# Patient Record
Sex: FEMALE | Race: BLACK OR AFRICAN AMERICAN | NOT HISPANIC OR LATINO | URBAN - NONMETROPOLITAN AREA
[De-identification: names, ages, dates, MRNs, and addresses within clinical notes are randomized per-mention and may not be internally consistent; named-entity substitution may affect disease eponyms.]

---

## 2021-01-08 ENCOUNTER — IMPORTED ENCOUNTER (OUTPATIENT)
Dept: URBAN - NONMETROPOLITAN AREA CLINIC 1 | Facility: CLINIC | Age: 61
End: 2021-01-08

## 2021-01-08 PROBLEM — H25.813: Noted: 2021-01-08

## 2021-01-08 PROBLEM — H52.4: Noted: 2021-01-08

## 2021-01-08 PROBLEM — E11.9: Noted: 2021-01-08

## 2021-01-08 PROCEDURE — 92004 COMPRE OPH EXAM NEW PT 1/>: CPT

## 2021-01-08 PROCEDURE — 92015 DETERMINE REFRACTIVE STATE: CPT

## 2021-01-08 NOTE — PATIENT DISCUSSION
Presbyopia OUDiscussed refractive status in detail with patient. MR done today but do not recommend updating due to cataract progression. Continue to monitor. IDDM sans Retinopathy Discussed diagnosis with patient. Discussed the risk of diabetic damage of the retina with potential vision loss and the importance of routine follow-up. Emphasized strict blood sugar control. Continue to monitor. Combined Cataract OU Discussed diagnosis with patient. Reviewed symptoms related to cataract progression. Discussed various treatment options with patient. Recommend cataract evaluation by Dr. Elayne Bright. Patient elects to schedule evaluation.

## 2021-02-17 ENCOUNTER — IMPORTED ENCOUNTER (OUTPATIENT)
Dept: URBAN - NONMETROPOLITAN AREA CLINIC 1 | Facility: CLINIC | Age: 61
End: 2021-02-17

## 2021-02-17 PROBLEM — H25.813: Noted: 2021-02-17

## 2021-02-17 PROCEDURE — 99214 OFFICE O/P EST MOD 30 MIN: CPT

## 2021-02-17 NOTE — PATIENT DISCUSSION
Cataract(s)-Visually significant cataract OU .-Cataract(s) causing symptomatic impairment of visual function not correctable with a tolerable change in glasses or contact lenses lighting or non-operative means resulting in specific activity limitations and/or participation restrictions including but not limited to reading viewing television driving or meeting vocational or recreational needs. -Expectation is clearer vision and functional improvement in symptoms as well as reduced glare disability after cataract removal.-Order IOLMaster and OPD today. -Recommend Stand/Trad based on today's OPD testing and lifestyle questionnaire.-All questions were answered regarding surgery including pre and post-op medications appointments activity restrictions and anesthetic usage.-The risks benefits and alternatives and special risk factors for the patient were discussed in detail including but not limited to: bleeding infection retinal detachment vitreous loss problems with the implant and possible need for additional surgery.-Although rare the possibility of complete vision loss was discussed.-The possible need for glasses post-operatively was discussed.-Order medical clearance exam based on history of diabetes-Patient elects to proceed with cataract surgery OD . Will schedule at patient's convenience and re-evaluate OS  in the future.

## 2021-03-23 ENCOUNTER — IMPORTED ENCOUNTER (OUTPATIENT)
Dept: URBAN - NONMETROPOLITAN AREA CLINIC 1 | Facility: CLINIC | Age: 61
End: 2021-03-23

## 2021-03-23 PROBLEM — Z01.818: Noted: 2021-03-23

## 2021-03-23 PROBLEM — M06.9: Noted: 2021-03-23

## 2021-03-23 PROBLEM — E78.5: Noted: 2021-03-23

## 2021-03-23 PROBLEM — E11.9: Noted: 2021-03-23

## 2021-03-23 PROBLEM — I10: Noted: 2021-03-23

## 2021-03-23 PROCEDURE — 99214 OFFICE O/P EST MOD 30 MIN: CPT

## 2021-04-13 ENCOUNTER — IMPORTED ENCOUNTER (OUTPATIENT)
Dept: URBAN - NONMETROPOLITAN AREA CLINIC 1 | Facility: CLINIC | Age: 61
End: 2021-04-13

## 2021-04-13 PROBLEM — Z98.41: Noted: 2021-04-13

## 2021-04-13 PROBLEM — H25.812: Noted: 2021-04-13

## 2021-04-13 PROCEDURE — 99024 POSTOP FOLLOW-UP VISIT: CPT

## 2021-04-13 PROCEDURE — 66982 XCAPSL CTRC RMVL CPLX WO ECP: CPT

## 2021-04-13 PROCEDURE — 92136 OPHTHALMIC BIOMETRY: CPT

## 2021-04-13 NOTE — PATIENT DISCUSSION
Cataract(s)-Visually significant cataract OS . -Cataract(s) causing symptomatic impairment of visual function not correctable with a tolerable change in glasses or contact lenses lighting or non-operative means resulting in specific activity limitations and/or participation restrictions including but not limited to reading viewing television driving or meeting vocational or recreational needs. -Expectation is clearer vision and functional improvement in symptoms as well as reduced glare disability after cataract removal.-Recommend Stand/Trad based on previous OPD testing and lifestyle questionnaire.-All questions were answered regarding surgery including pre and post-op medications appointments activity restrictions and anesthetic usage.-The risks benefits and alternatives and special risk factors for the patient were discussed in detail including but not limited to: bleeding infection retinal detachment vitreous loss problems with the implant and possible need for additional surgery.-Although rare the possibility of complete vision loss was discussed.-The need for glasses post-operatively was discussed.-Patient elects to proceed with cataract surgery OS . Will schedule at patient's convenience. s/p PCIOL Same Day POV CE OD Stand/Trad -Pt doing well s/p PCIOL. -Continue post-op gtts according to instruction sheet and sleep with eye shield over eye for 7 nights.-Avoid bending at the waist lifting anything over 5lbs and dirty or ingrid environments.

## 2021-04-21 ENCOUNTER — IMPORTED ENCOUNTER (OUTPATIENT)
Dept: URBAN - NONMETROPOLITAN AREA CLINIC 1 | Facility: CLINIC | Age: 61
End: 2021-04-21

## 2021-04-21 PROBLEM — H26.491: Noted: 2021-04-21

## 2021-04-21 PROBLEM — Z98.41: Noted: 2021-04-13

## 2021-04-21 PROBLEM — Z98.42: Noted: 2021-04-21

## 2021-04-21 PROCEDURE — 99024 POSTOP FOLLOW-UP VISIT: CPT

## 2021-04-21 NOTE — PATIENT DISCUSSION
1 Day POV CE OS 04/20/21 CE OD 04/13/2021 Stand OU- Discussed diagnosis in detail with patient- Patient is stable and doing well- Wound intact- Continue all post op drops as directed- PCO OD noted but stable and no treatment needed at this time - Continue to monitor- RTC 1 week POV with Dr. Matthew Fields

## 2021-04-26 ENCOUNTER — IMPORTED ENCOUNTER (OUTPATIENT)
Dept: URBAN - NONMETROPOLITAN AREA CLINIC 1 | Facility: CLINIC | Age: 61
End: 2021-04-26

## 2021-04-26 PROCEDURE — 99024 POSTOP FOLLOW-UP VISIT: CPT

## 2021-04-26 NOTE — PATIENT DISCUSSION
1 week POV CE OS 04/20/21 CE OD 04/13/2021 Stand OU- Discussed diagnosis in detail with patient- Patient is stable and doing well- Wound intact- Continue all post op drops as directed- Continue to monitor- RTC 3 week POV with

## 2021-05-17 ENCOUNTER — IMPORTED ENCOUNTER (OUTPATIENT)
Dept: URBAN - NONMETROPOLITAN AREA CLINIC 1 | Facility: CLINIC | Age: 61
End: 2021-05-17

## 2021-05-17 PROCEDURE — 92015 DETERMINE REFRACTIVE STATE: CPT

## 2021-05-17 PROCEDURE — 99024 POSTOP FOLLOW-UP VISIT: CPT

## 2021-05-17 NOTE — PATIENT DISCUSSION
4 week POV CE OS 04/20/21 CE OD 04/13/2021 Stand OU- Discussed diagnosis in detail with patient- Patient is stable and doing well- Wound intact- Finished all post op drops as directed- MR done today; new glasses Rx given but ok to continue using OTC readers- Continue to monitor- RTC 3 months for follow up with [facility]

## 2022-04-09 ASSESSMENT — TONOMETRY
OD_IOP_MMHG: 18
OS_IOP_MMHG: 12
OS_IOP_MMHG: 118
OS_IOP_MMHG: 15
OD_IOP_MMHG: 13
OS_IOP_MMHG: 16
OD_IOP_MMHG: 12
OS_IOP_MMHG: 15
OD_IOP_MMHG: 17
OD_IOP_MMHG: 16
OS_IOP_MMHG: 25
OD_IOP_MMHG: 19

## 2022-04-09 ASSESSMENT — VISUAL ACUITY
OD_SC: 20/60-
OS_AM: 20/25
OD_CC: 20/60
OD_CC: 20/25-
OD_PAM: 20/30
OS_CC: 20/63-
OD_CC: 20/50
OS_CC: 20/25-
OS_PH: 20/40
OS_AM: 20/25
OS_SC: 20/40-2
OS_CC: 20/25-
OS_PH: 20/30
OD_CC: 20/100+
OS_PH: 20/30-
OS_CC: 20/50
OD_CC: 20/30-
OD_CC: 20/30-2
OS_CC: 20/50
OS_CC: 20/25+
OD_PH: 20/40

## 2022-04-14 ENCOUNTER — FOLLOW UP (OUTPATIENT)
Dept: URBAN - NONMETROPOLITAN AREA CLINIC 1 | Facility: CLINIC | Age: 62
End: 2022-04-14

## 2022-04-14 DIAGNOSIS — H52.4: ICD-10-CM

## 2022-04-14 PROCEDURE — 92015 DETERMINE REFRACTIVE STATE: CPT

## 2022-04-14 PROCEDURE — 92014 COMPRE OPH EXAM EST PT 1/>: CPT

## 2022-04-14 ASSESSMENT — TONOMETRY
OD_IOP_MMHG: 16
OS_IOP_MMHG: 16

## 2022-04-14 ASSESSMENT — VISUAL ACUITY
OD_SC: 20/30-1
OS_SC: 20/25-2

## 2023-04-17 ENCOUNTER — ESTABLISHED PATIENT (OUTPATIENT)
Dept: URBAN - NONMETROPOLITAN AREA CLINIC 1 | Facility: CLINIC | Age: 63
End: 2023-04-17

## 2023-04-17 DIAGNOSIS — E11.9: ICD-10-CM

## 2023-04-17 PROCEDURE — 99213 OFFICE O/P EST LOW 20 MIN: CPT

## 2023-04-17 ASSESSMENT — VISUAL ACUITY
OS_SC: 20/20-2
OU_SC: 20/20+2
OD_SC: 20/32

## 2023-04-17 ASSESSMENT — TONOMETRY
OS_IOP_MMHG: 15
OD_IOP_MMHG: 15

## 2025-02-28 ENCOUNTER — COMPREHENSIVE EXAM (OUTPATIENT)
Age: 65
End: 2025-02-28

## 2025-02-28 DIAGNOSIS — H52.4: ICD-10-CM

## 2025-02-28 PROCEDURE — 92015 DETERMINE REFRACTIVE STATE: CPT

## 2025-02-28 PROCEDURE — 92014 COMPRE OPH EXAM EST PT 1/>: CPT
